# Patient Record
Sex: MALE | Race: WHITE | URBAN - METROPOLITAN AREA
[De-identification: names, ages, dates, MRNs, and addresses within clinical notes are randomized per-mention and may not be internally consistent; named-entity substitution may affect disease eponyms.]

---

## 2018-03-28 ENCOUNTER — HOSPITAL ENCOUNTER (INPATIENT)
Facility: CLINIC | Age: 43
LOS: 1 days | Discharge: HOME | DRG: 156 | End: 2018-03-30
Attending: EMERGENCY MEDICINE | Admitting: HOSPITALIST
Payer: OTHER GOVERNMENT

## 2018-03-28 ENCOUNTER — APPOINTMENT (OUTPATIENT)
Dept: CT IMAGING | Facility: CLINIC | Age: 43
DRG: 156 | End: 2018-03-28
Attending: EMERGENCY MEDICINE
Payer: OTHER GOVERNMENT

## 2018-03-28 DIAGNOSIS — J39.0 CELLULITIS OF PARAPHARYNGEAL SPACE: ICD-10-CM

## 2018-03-28 LAB
ANION GAP SERPL CALCULATED.3IONS-SCNC: 9 MMOL/L (ref 3–14)
BASOPHILS # BLD AUTO: 0 10E9/L (ref 0–0.2)
BASOPHILS NFR BLD AUTO: 0.2 %
BUN SERPL-MCNC: 18 MG/DL (ref 7–30)
CALCIUM SERPL-MCNC: 9 MG/DL (ref 8.5–10.1)
CHLORIDE SERPL-SCNC: 104 MMOL/L (ref 94–109)
CO2 SERPL-SCNC: 26 MMOL/L (ref 20–32)
CREAT SERPL-MCNC: 1.13 MG/DL (ref 0.66–1.25)
DEPRECATED S PYO AG THROAT QL EIA: NORMAL
DIFFERENTIAL METHOD BLD: ABNORMAL
EOSINOPHIL # BLD AUTO: 0.1 10E9/L (ref 0–0.7)
EOSINOPHIL NFR BLD AUTO: 0.5 %
ERYTHROCYTE [DISTWIDTH] IN BLOOD BY AUTOMATED COUNT: 12.6 % (ref 10–15)
GFR SERPL CREATININE-BSD FRML MDRD: 71 ML/MIN/1.7M2
GLUCOSE SERPL-MCNC: 103 MG/DL (ref 70–99)
HCT VFR BLD AUTO: 42 % (ref 40–53)
HGB BLD-MCNC: 15 G/DL (ref 13.3–17.7)
IMM GRANULOCYTES # BLD: 0.1 10E9/L (ref 0–0.4)
IMM GRANULOCYTES NFR BLD: 0.3 %
LYMPHOCYTES # BLD AUTO: 2 10E9/L (ref 0.8–5.3)
LYMPHOCYTES NFR BLD AUTO: 13.3 %
MCH RBC QN AUTO: 32.6 PG (ref 26.5–33)
MCHC RBC AUTO-ENTMCNC: 35.7 G/DL (ref 31.5–36.5)
MCV RBC AUTO: 91 FL (ref 78–100)
MONOCYTES # BLD AUTO: 0.8 10E9/L (ref 0–1.3)
MONOCYTES NFR BLD AUTO: 5.3 %
NEUTROPHILS # BLD AUTO: 11.9 10E9/L (ref 1.6–8.3)
NEUTROPHILS NFR BLD AUTO: 80.4 %
PLATELET # BLD AUTO: 180 10E9/L (ref 150–450)
POTASSIUM SERPL-SCNC: 3.5 MMOL/L (ref 3.4–5.3)
RBC # BLD AUTO: 4.6 10E12/L (ref 4.4–5.9)
SODIUM SERPL-SCNC: 139 MMOL/L (ref 133–144)
SPECIMEN SOURCE: NORMAL
WBC # BLD AUTO: 14.8 10E9/L (ref 4–11)

## 2018-03-28 PROCEDURE — 25000128 H RX IP 250 OP 636: Performed by: EMERGENCY MEDICINE

## 2018-03-28 PROCEDURE — 96361 HYDRATE IV INFUSION ADD-ON: CPT

## 2018-03-28 PROCEDURE — 87880 STREP A ASSAY W/OPTIC: CPT | Performed by: EMERGENCY MEDICINE

## 2018-03-28 PROCEDURE — 80048 BASIC METABOLIC PNL TOTAL CA: CPT | Performed by: EMERGENCY MEDICINE

## 2018-03-28 PROCEDURE — 99285 EMERGENCY DEPT VISIT HI MDM: CPT | Mod: 25

## 2018-03-28 PROCEDURE — 94640 AIRWAY INHALATION TREATMENT: CPT

## 2018-03-28 PROCEDURE — 25000132 ZZH RX MED GY IP 250 OP 250 PS 637: Performed by: EMERGENCY MEDICINE

## 2018-03-28 PROCEDURE — 36415 COLL VENOUS BLD VENIPUNCTURE: CPT

## 2018-03-28 PROCEDURE — 40000275 ZZH STATISTIC RCP TIME EA 10 MIN

## 2018-03-28 PROCEDURE — 87081 CULTURE SCREEN ONLY: CPT | Performed by: EMERGENCY MEDICINE

## 2018-03-28 PROCEDURE — 96375 TX/PRO/DX INJ NEW DRUG ADDON: CPT

## 2018-03-28 PROCEDURE — 85025 COMPLETE CBC W/AUTO DIFF WBC: CPT | Performed by: EMERGENCY MEDICINE

## 2018-03-28 PROCEDURE — 70491 CT SOFT TISSUE NECK W/DYE: CPT

## 2018-03-28 PROCEDURE — 96365 THER/PROPH/DIAG IV INF INIT: CPT | Mod: 59

## 2018-03-28 PROCEDURE — 92511 NASOPHARYNGOSCOPY: CPT

## 2018-03-28 PROCEDURE — 25000125 ZZHC RX 250: Performed by: EMERGENCY MEDICINE

## 2018-03-28 RX ORDER — AMPICILLIN AND SULBACTAM 2; 1 G/1; G/1
3 INJECTION, POWDER, FOR SOLUTION INTRAMUSCULAR; INTRAVENOUS ONCE
Status: COMPLETED | OUTPATIENT
Start: 2018-03-28 | End: 2018-03-29

## 2018-03-28 RX ORDER — IOPAMIDOL 755 MG/ML
80 INJECTION, SOLUTION INTRAVASCULAR ONCE
Status: COMPLETED | OUTPATIENT
Start: 2018-03-28 | End: 2018-03-28

## 2018-03-28 RX ORDER — SODIUM CHLORIDE 9 MG/ML
1000 INJECTION, SOLUTION INTRAVENOUS CONTINUOUS
Status: DISCONTINUED | OUTPATIENT
Start: 2018-03-28 | End: 2018-03-29

## 2018-03-28 RX ORDER — KETOROLAC TROMETHAMINE 30 MG/ML
30 INJECTION, SOLUTION INTRAMUSCULAR; INTRAVENOUS ONCE
Status: COMPLETED | OUTPATIENT
Start: 2018-03-28 | End: 2018-03-28

## 2018-03-28 RX ORDER — DEXAMETHASONE SODIUM PHOSPHATE 10 MG/ML
10 INJECTION, SOLUTION INTRAMUSCULAR; INTRAVENOUS ONCE
Status: COMPLETED | OUTPATIENT
Start: 2018-03-28 | End: 2018-03-28

## 2018-03-28 RX ORDER — IOPAMIDOL 755 MG/ML
80 INJECTION, SOLUTION INTRAVASCULAR ONCE
Status: DISCONTINUED | OUTPATIENT
Start: 2018-03-28 | End: 2018-03-28 | Stop reason: DRUGHIGH

## 2018-03-28 RX ADMIN — RACEPINEPHRINE HYDROCHLORIDE 0.5 ML: 11.25 SOLUTION RESPIRATORY (INHALATION) at 23:55

## 2018-03-28 RX ADMIN — AMPICILLIN SODIUM AND SULBACTAM SODIUM 3 G: 2; 1 INJECTION, POWDER, FOR SOLUTION INTRAMUSCULAR; INTRAVENOUS at 23:41

## 2018-03-28 RX ADMIN — SODIUM CHLORIDE 60 ML: 9 INJECTION, SOLUTION INTRAVENOUS at 23:04

## 2018-03-28 RX ADMIN — DEXAMETHASONE SODIUM PHOSPHATE 10 MG: 10 INJECTION, SOLUTION INTRAMUSCULAR; INTRAVENOUS at 22:24

## 2018-03-28 RX ADMIN — KETOROLAC TROMETHAMINE 30 MG: 30 INJECTION, SOLUTION INTRAMUSCULAR at 22:24

## 2018-03-28 RX ADMIN — IOPAMIDOL 80 ML: 755 INJECTION, SOLUTION INTRAVENOUS at 23:04

## 2018-03-28 RX ADMIN — SODIUM CHLORIDE 1000 ML: 9 INJECTION, SOLUTION INTRAVENOUS at 22:23

## 2018-03-28 ASSESSMENT — ENCOUNTER SYMPTOMS
ABDOMINAL PAIN: 0
TROUBLE SWALLOWING: 1
SORE THROAT: 1

## 2018-03-28 NOTE — IP AVS SNAPSHOT
MRN:[id number]                      After Visit Summary   3/28/2018    Tom Benavides    MRN: [id number]           Thank you!     Thank you for choosing [facility] for your care. Our goal is always to provide you with excellent care. Hearing back from our patients is one way we can continue to improve our services. Please take a few minutes to complete the written survey that you may receive in the mail after you visit with us. Thank you!        Patient Information     Date Of Birth          1975        Designated Caregiver       Most Recent Value    Caregiver    Will someone help with your care after discharge? yes    Name of designated caregiver Jaquelin, spouse.    Phone number of caregiver See contact information    Caregiver address See contact information      About your hospital stay     You were admitted on:  March 29, 2018 You last received care in the:  [facility] Intensive [facility] Unit    You were discharged on:  March 30, 2018        Reason for your hospital stay       This is a 43 year old male admitted with pharyngitis.                  Who to Call     For medical emergencies, please call 911.  For non-urgent questions about your medical care, please call your primary care provider or clinic, None          Attending Provider     Provider Specialty    Trierweiler, Chad A, MD Emergency Medicine    [facility], Philip RIOS MD Internal Medicine       Primary Care Provider Fax #    Physician No Ref-Primary [phone]      After Care Instructions     Activity       Your activity upon discharge: activity as tolerated            Diet       Follow this diet upon discharge: Regular                  Follow-up Appointments     Follow-up and recommended labs and tests        Follow up with primary care provider, Physician No Ref-Primary, within 7 days for hospital follow- up.  No follow up labs or test are needed.                  Pending Results     Date and Time Order Name Status Description  "   3/29/2018 0140 Blood culture Preliminary     3/29/2018 0140 Blood culture Preliminary     3/28/2018 2229 Beta strep group A culture Preliminary             Statement of Approval     Ordered          18 0942  I have reviewed and agree with all the recommendations and orders detailed in this document.  EFFECTIVE NOW     Approved and electronically signed by:  Angel Phillip MD             Admission Information     Date & Time Provider Department Dept. Phone    3/28/2018 Philip Dale MD [facility] Intensive Care Unit [phone]      Your Vitals Were     Blood Pressure Pulse Temperature Respirations Weight Pulse Oximetry    132/82 89 98  F (36.7  C) (Oral) 18 119.5 kg (263 lb 7.2 oz) 95%      MyChart Information     [employer] lets you send messages to your doctor, view your test results, renew your prescriptions, schedule appointments and more. To sign up, go to [email or web address] . Click on \"Log in\" on the left side of the screen, which will take you to the Welcome page. Then click on \"Sign up Now\" on the right side of the page.     You will be asked to enter the access code listed below, as well as some personal information. Please follow the directions to create your username and password.     Your access code is: [email or web address]  Expires: 2018  9:41 AM     Your access code will  in 90 days. If you need help or a new code, please call your [facility] clinic or [phone].        Care EveryWhere ID     This is your Care EveryWhere ID. This could be used by other organizations to access your [facility] medical records  [id number]        Equal Access to Services     [facility] Center[facility]EL : Hadii bryan Lazo, waaxda luqadaha, qaybta kaalmada daily, ant gerber. So [facility] [phone].    ATENCIÓN: Si habla español, tiene a jenkins disposición servicios gratuitos de asistencia lingüística. Llame al [phone].    We comply with applicable federal " civil rights laws and [place] laws. We do not discriminate on the basis of race, color, national origin, age, disability, sex, sexual orientation, or gender identity.               Review of your medicines      START taking        Dose / Directions    amoxicillin-clavulanate 875-125 MG per tablet   Commonly known as:  AUGMENTIN   Used for:  Cellulitis of parapharyngeal space        Dose:  1 tablet   Take 1 tablet by mouth 2 times daily   Quantity:  20 tablet   Refills:  0       predniSONE 20 MG tablet   Commonly known as:  DELTASONE   Used for:  Cellulitis of parapharyngeal space        Dose:  40 mg   Take 2 tablets (40 mg) by mouth daily for 5 days   Quantity:  5 tablet   Refills:  0         CONTINUE these medicines which have NOT CHANGED        Dose / Directions    multivitamin, therapeutic with minerals Tabs tablet        Dose:  1 tablet   Take 1 tablet by mouth daily   Refills:  0       omega-3 acid ethyl esters 1 G capsule   Commonly known as:  Lovaza        Dose:  1 g   Take 1 g by mouth daily   Refills:  0            Where to get your medicines      These medications were sent to [facility] - [address]  [address] [facility] [address]     Phone:  [phone]     amoxicillin-clavulanate 875-125 MG per tablet    predniSONE 20 MG tablet                Protect others around you: Learn how to safely use, store and throw away your medicines at www.disposemymeds.org.        ANTIBIOTIC INSTRUCTION     You've Been Prescribed an Antibiotic - Now What?  Your healthcare team thinks that you or your loved one might have an infection. Some infections can be treated with antibiotics, which are powerful, life-saving drugs. Like all medications, antibiotics have side effects and should only be used when necessary. There are some important things you should know about your antibiotic treatment.      Your healthcare team may run tests before you start taking an  antibiotic.    Your team may take samples (e.g., from your blood, urine or other areas) to run tests to look for bacteria. These test can be important to determine if you need an antibiotic at all and, if you do, which antibiotic will work best.      Within a few days, your healthcare team might change or even stop your antibiotic.    Your team may start you on an antibiotic while they are working to find out what is making you sick.    Your team might change your antibiotic because test results show that a different antibiotic would be better to treat your infection.    In some cases, once your team has more information, they learn that you do not need an antibiotic at all. They may find out that you don't have an infection, or that the antibiotic you're taking won't work against your infection. For example, an infection caused by a virus can't be treated with antibiotics. Staying on an antibiotic when you don't need it is more likely to be harmful than helpful.      You may experience side effects from your antibiotic.    Like all medications, antibiotics have side effects. Some of these can be serious.    Let you healthcare team know if you have any known allergies when you are admitted to the hospital.    One significant side effect of nearly all antibiotics is the risk of severe and sometimes deadly diarrhea caused by Clostridium difficile (C. Difficile). This occurs when a person takes antibiotics because some good germs are destroyed. Antibiotic use allows C. diificile to take over, putting patients at high risk for this serious infection.    As a patient or caregiver, it is important to understand your or your loved one's antibiotic treatment. It is especially important for caregivers to speak up when patients can't speak for themselves. Here are some important questions to ask your healthcare team.    What infection is this antibiotic treating and how do you know I have that infection?    What side effects  might occur from this antibiotic?    How long will I need to take this antibiotic?    Is it safe to take this antibiotic with other medications or supplements (e.g., vitamins) that I am taking?     Are there any special directions I need to know about taking this antibiotic? For example, should I take it with food?    How will I be monitored to know whether my infection is responding to the antibiotic?    What tests may help to make sure the right antibiotic is prescribed for me?      Information provided by:  www.cdc.gov/getsmart  U.S. Department of Health and Human Services  Centers for disease Control and Prevention  National Center for Emerging and Zoonotic Infectious Diseases  Division of Healthcare Quality Promotion             Medication List: This is a list of all your medications and when to take them. Check marks below indicate your daily home schedule. Keep this list as a reference.      Medications           Morning Afternoon Evening Bedtime As Needed    amoxicillin-clavulanate 875-125 MG per tablet   Commonly known as:  AUGMENTIN   Take 1 tablet by mouth 2 times daily                                multivitamin, therapeutic with minerals Tabs tablet   Take 1 tablet by mouth daily                                omega-3 acid ethyl esters 1 G capsule   Commonly known as:  Lovaza   Take 1 g by mouth daily                                predniSONE 20 MG tablet   Commonly known as:  DELTASONE   Take 2 tablets (40 mg) by mouth daily for 5 days

## 2018-03-28 NOTE — IP AVS SNAPSHOT
[facility]    [address] [place] [address]    Phone:  [phone]                                       After Visit Summary   3/28/2018    Tom Benavides    MRN: [id number]           After Visit Summary Signature Page     I have received my discharge instructions, and my questions have been answered. I have discussed any challenges I see with this plan with the nurse or doctor.    ..........................................................................................................................................  Patient/Patient Representative Signature      ..........................................................................................................................................  Patient Representative Print Name and Relationship to Patient    ..................................................               ................................................  Date                                            Time    ..........................................................................................................................................  Reviewed by Signature/Title    ...................................................              ..............................................  Date                                                            Time

## 2018-03-29 PROBLEM — J38.4 SUBGLOTTIC EDEMA: Status: ACTIVE | Noted: 2018-03-29

## 2018-03-29 LAB
ANION GAP SERPL CALCULATED.3IONS-SCNC: 10 MMOL/L (ref 3–14)
BASOPHILS # BLD AUTO: 0 10E9/L (ref 0–0.2)
BASOPHILS NFR BLD AUTO: 0.1 %
BUN SERPL-MCNC: 16 MG/DL (ref 7–30)
CALCIUM SERPL-MCNC: 8.9 MG/DL (ref 8.5–10.1)
CHLORIDE SERPL-SCNC: 104 MMOL/L (ref 94–109)
CO2 SERPL-SCNC: 22 MMOL/L (ref 20–32)
CREAT SERPL-MCNC: 0.95 MG/DL (ref 0.66–1.25)
DIFFERENTIAL METHOD BLD: ABNORMAL
EOSINOPHIL # BLD AUTO: 0 10E9/L (ref 0–0.7)
EOSINOPHIL NFR BLD AUTO: 0 %
ERYTHROCYTE [DISTWIDTH] IN BLOOD BY AUTOMATED COUNT: 12.4 % (ref 10–15)
GFR SERPL CREATININE-BSD FRML MDRD: 86 ML/MIN/1.7M2
GLUCOSE BLDC GLUCOMTR-MCNC: 141 MG/DL (ref 70–99)
GLUCOSE BLDC GLUCOMTR-MCNC: 143 MG/DL (ref 70–99)
GLUCOSE BLDC GLUCOMTR-MCNC: 144 MG/DL (ref 70–99)
GLUCOSE BLDC GLUCOMTR-MCNC: 149 MG/DL (ref 70–99)
GLUCOSE BLDC GLUCOMTR-MCNC: 159 MG/DL (ref 70–99)
GLUCOSE SERPL-MCNC: 165 MG/DL (ref 70–99)
HCT VFR BLD AUTO: 40.7 % (ref 40–53)
HGB BLD-MCNC: 14.6 G/DL (ref 13.3–17.7)
IMM GRANULOCYTES # BLD: 0 10E9/L (ref 0–0.4)
IMM GRANULOCYTES NFR BLD: 0.2 %
INR PPP: 1.11 (ref 0.86–1.14)
LYMPHOCYTES # BLD AUTO: 0.5 10E9/L (ref 0.8–5.3)
LYMPHOCYTES NFR BLD AUTO: 2.9 %
MCH RBC QN AUTO: 32.6 PG (ref 26.5–33)
MCHC RBC AUTO-ENTMCNC: 35.9 G/DL (ref 31.5–36.5)
MCV RBC AUTO: 91 FL (ref 78–100)
MONOCYTES # BLD AUTO: 0.5 10E9/L (ref 0–1.3)
MONOCYTES NFR BLD AUTO: 3.1 %
MRSA DNA SPEC QL NAA+PROBE: NEGATIVE
NEUTROPHILS # BLD AUTO: 16.4 10E9/L (ref 1.6–8.3)
NEUTROPHILS NFR BLD AUTO: 93.7 %
PLATELET # BLD AUTO: 174 10E9/L (ref 150–450)
POTASSIUM SERPL-SCNC: 4.2 MMOL/L (ref 3.4–5.3)
RBC # BLD AUTO: 4.48 10E12/L (ref 4.4–5.9)
SODIUM SERPL-SCNC: 136 MMOL/L (ref 133–144)
SPECIMEN SOURCE: NORMAL
WBC # BLD AUTO: 17.5 10E9/L (ref 4–11)

## 2018-03-29 PROCEDURE — 20000003 ZZH R&B ICU

## 2018-03-29 PROCEDURE — 87640 STAPH A DNA AMP PROBE: CPT | Performed by: HOSPITALIST

## 2018-03-29 PROCEDURE — 96376 TX/PRO/DX INJ SAME DRUG ADON: CPT

## 2018-03-29 PROCEDURE — 25000125 ZZHC RX 250: Performed by: EMERGENCY MEDICINE

## 2018-03-29 PROCEDURE — 99223 1ST HOSP IP/OBS HIGH 75: CPT | Mod: AI | Performed by: HOSPITALIST

## 2018-03-29 PROCEDURE — 85025 COMPLETE CBC W/AUTO DIFF WBC: CPT | Performed by: HOSPITALIST

## 2018-03-29 PROCEDURE — 87040 BLOOD CULTURE FOR BACTERIA: CPT | Performed by: EMERGENCY MEDICINE

## 2018-03-29 PROCEDURE — 00000146 ZZHCL STATISTIC GLUCOSE BY METER IP

## 2018-03-29 PROCEDURE — 96375 TX/PRO/DX INJ NEW DRUG ADDON: CPT

## 2018-03-29 PROCEDURE — 36415 COLL VENOUS BLD VENIPUNCTURE: CPT | Performed by: HOSPITALIST

## 2018-03-29 PROCEDURE — 87641 MR-STAPH DNA AMP PROBE: CPT | Performed by: HOSPITALIST

## 2018-03-29 PROCEDURE — 80048 BASIC METABOLIC PNL TOTAL CA: CPT | Performed by: HOSPITALIST

## 2018-03-29 PROCEDURE — 25000128 H RX IP 250 OP 636: Performed by: EMERGENCY MEDICINE

## 2018-03-29 PROCEDURE — 85610 PROTHROMBIN TIME: CPT | Performed by: HOSPITALIST

## 2018-03-29 PROCEDURE — 25000125 ZZHC RX 250: Performed by: HOSPITALIST

## 2018-03-29 PROCEDURE — 25000128 H RX IP 250 OP 636: Performed by: HOSPITALIST

## 2018-03-29 PROCEDURE — 99207 ZZC NON-BILLABLE SERV PER CHARTING: CPT | Performed by: INTERNAL MEDICINE

## 2018-03-29 RX ORDER — ONDANSETRON 4 MG/1
4 TABLET, ORALLY DISINTEGRATING ORAL EVERY 6 HOURS PRN
Status: DISCONTINUED | OUTPATIENT
Start: 2018-03-29 | End: 2018-03-30 | Stop reason: HOSPADM

## 2018-03-29 RX ORDER — NALOXONE HYDROCHLORIDE 0.4 MG/ML
.1-.4 INJECTION, SOLUTION INTRAMUSCULAR; INTRAVENOUS; SUBCUTANEOUS
Status: DISCONTINUED | OUTPATIENT
Start: 2018-03-29 | End: 2018-03-30 | Stop reason: HOSPADM

## 2018-03-29 RX ORDER — DEXAMETHASONE SODIUM PHOSPHATE 4 MG/ML
10 INJECTION, SOLUTION INTRA-ARTICULAR; INTRALESIONAL; INTRAMUSCULAR; INTRAVENOUS; SOFT TISSUE EVERY 6 HOURS
Status: DISCONTINUED | OUTPATIENT
Start: 2018-03-29 | End: 2018-03-30 | Stop reason: HOSPADM

## 2018-03-29 RX ORDER — ALBUTEROL SULFATE 0.83 MG/ML
2.5 SOLUTION RESPIRATORY (INHALATION)
Status: DISCONTINUED | OUTPATIENT
Start: 2018-03-29 | End: 2018-03-30 | Stop reason: HOSPADM

## 2018-03-29 RX ORDER — ONDANSETRON 2 MG/ML
4 INJECTION INTRAMUSCULAR; INTRAVENOUS EVERY 6 HOURS PRN
Status: DISCONTINUED | OUTPATIENT
Start: 2018-03-29 | End: 2018-03-30 | Stop reason: HOSPADM

## 2018-03-29 RX ORDER — AMPICILLIN AND SULBACTAM 2; 1 G/1; G/1
3 INJECTION, POWDER, FOR SOLUTION INTRAMUSCULAR; INTRAVENOUS EVERY 6 HOURS
Status: DISCONTINUED | OUTPATIENT
Start: 2018-03-29 | End: 2018-03-30 | Stop reason: HOSPADM

## 2018-03-29 RX ORDER — KETOROLAC TROMETHAMINE 30 MG/ML
30 INJECTION, SOLUTION INTRAMUSCULAR; INTRAVENOUS EVERY 6 HOURS PRN
Status: DISCONTINUED | OUTPATIENT
Start: 2018-03-29 | End: 2018-03-30 | Stop reason: HOSPADM

## 2018-03-29 RX ORDER — HYDROMORPHONE HYDROCHLORIDE 1 MG/ML
.3-.5 INJECTION, SOLUTION INTRAMUSCULAR; INTRAVENOUS; SUBCUTANEOUS
Status: DISCONTINUED | OUTPATIENT
Start: 2018-03-29 | End: 2018-03-30 | Stop reason: HOSPADM

## 2018-03-29 RX ORDER — ACETAMINOPHEN 650 MG/1
650 SUPPOSITORY RECTAL EVERY 4 HOURS PRN
Status: DISCONTINUED | OUTPATIENT
Start: 2018-03-29 | End: 2018-03-30 | Stop reason: HOSPADM

## 2018-03-29 RX ORDER — MULTIPLE VITAMINS W/ MINERALS TAB 9MG-400MCG
1 TAB ORAL DAILY
COMMUNITY

## 2018-03-29 RX ORDER — OMEGA-3-ACID ETHYL ESTERS 1 G/1
1 CAPSULE, LIQUID FILLED ORAL DAILY
COMMUNITY

## 2018-03-29 RX ORDER — CLINDAMYCIN PHOSPHATE 900 MG/50ML
900 INJECTION, SOLUTION INTRAVENOUS EVERY 8 HOURS
Status: DISCONTINUED | OUTPATIENT
Start: 2018-03-29 | End: 2018-03-30 | Stop reason: HOSPADM

## 2018-03-29 RX ORDER — DEXAMETHASONE SODIUM PHOSPHATE 10 MG/ML
10 INJECTION, SOLUTION INTRAMUSCULAR; INTRAVENOUS ONCE
Status: COMPLETED | OUTPATIENT
Start: 2018-03-29 | End: 2018-03-29

## 2018-03-29 RX ORDER — SODIUM CHLORIDE 9 MG/ML
INJECTION, SOLUTION INTRAVENOUS CONTINUOUS
Status: DISCONTINUED | OUTPATIENT
Start: 2018-03-29 | End: 2018-03-30 | Stop reason: HOSPADM

## 2018-03-29 RX ORDER — CLINDAMYCIN PHOSPHATE 900 MG/50ML
900 INJECTION, SOLUTION INTRAVENOUS ONCE
Status: COMPLETED | OUTPATIENT
Start: 2018-03-29 | End: 2018-03-29

## 2018-03-29 RX ADMIN — DEXAMETHASONE SODIUM PHOSPHATE 10 MG: 10 INJECTION, SOLUTION INTRAMUSCULAR; INTRAVENOUS at 01:53

## 2018-03-29 RX ADMIN — CLINDAMYCIN PHOSPHATE 900 MG: 18 INJECTION, SOLUTION INTRAVENOUS at 09:03

## 2018-03-29 RX ADMIN — CLINDAMYCIN PHOSPHATE 900 MG: 18 INJECTION, SOLUTION INTRAVENOUS at 17:22

## 2018-03-29 RX ADMIN — AMPICILLIN SODIUM AND SULBACTAM SODIUM 3 G: 2; 1 INJECTION, POWDER, FOR SOLUTION INTRAMUSCULAR; INTRAVENOUS at 12:19

## 2018-03-29 RX ADMIN — SODIUM CHLORIDE: 9 INJECTION, SOLUTION INTRAVENOUS at 15:05

## 2018-03-29 RX ADMIN — DEXAMETHASONE SODIUM PHOSPHATE 10 MG: 4 INJECTION, SOLUTION INTRAMUSCULAR; INTRAVENOUS at 08:57

## 2018-03-29 RX ADMIN — KETOROLAC TROMETHAMINE 30 MG: 30 INJECTION, SOLUTION INTRAMUSCULAR at 06:58

## 2018-03-29 RX ADMIN — DEXAMETHASONE SODIUM PHOSPHATE 10 MG: 4 INJECTION, SOLUTION INTRAMUSCULAR; INTRAVENOUS at 14:46

## 2018-03-29 RX ADMIN — AMPICILLIN SODIUM AND SULBACTAM SODIUM 3 G: 2; 1 INJECTION, POWDER, FOR SOLUTION INTRAMUSCULAR; INTRAVENOUS at 06:42

## 2018-03-29 RX ADMIN — DEXAMETHASONE SODIUM PHOSPHATE 10 MG: 4 INJECTION, SOLUTION INTRAMUSCULAR; INTRAVENOUS at 20:11

## 2018-03-29 RX ADMIN — AMPICILLIN SODIUM AND SULBACTAM SODIUM 3 G: 2; 1 INJECTION, POWDER, FOR SOLUTION INTRAMUSCULAR; INTRAVENOUS at 18:32

## 2018-03-29 RX ADMIN — CLINDAMYCIN PHOSPHATE 900 MG: 18 INJECTION, SOLUTION INTRAVENOUS at 01:53

## 2018-03-29 RX ADMIN — SODIUM CHLORIDE: 9 INJECTION, SOLUTION INTRAVENOUS at 03:05

## 2018-03-29 ASSESSMENT — ACTIVITIES OF DAILY LIVING (ADL)
DRESS: 0-->INDEPENDENT
FALL_HISTORY_WITHIN_LAST_SIX_MONTHS: NO
RETIRED_EATING: 0-->INDEPENDENT
COGNITION: 0 - NO COGNITION ISSUES REPORTED
AMBULATION: 0-->INDEPENDENT
TRANSFERRING: 0-->INDEPENDENT
BATHING: 0-->INDEPENDENT
RETIRED_COMMUNICATION: 0-->UNDERSTANDS/COMMUNICATES WITHOUT DIFFICULTY
TOILETING: 0-->INDEPENDENT
SWALLOWING: 0-->SWALLOWS FOODS/LIQUIDS WITHOUT DIFFICULTY

## 2018-03-29 ASSESSMENT — PAIN DESCRIPTION - DESCRIPTORS
DESCRIPTORS: SORE
DESCRIPTORS: ACHING
DESCRIPTORS: SORE

## 2018-03-29 NOTE — ED PROVIDER NOTES
History     Chief Complaint:  Pharyngitis    HPI   Tom Benavides is a 43 year old male who presents to the emergency department today for evaluation of pharyngitis. The patient reports an onset of sore throat today around 1700 with difficulty breathing on his left side and trouble swallowing. He has never felt this sensation in the past and it is getting harder to breath prompting his visit to the emergency department. He denies fever and abdominal pain.     HISTORY LIMITED SECONDARY TO ACUITY OF CONDITION    Allergies:  No Known Drug Allergies    Medications:    The patient is currently on no regular medications.    Past Medical History:    History reviewed. No pertinent past medical history.    Past Surgical History:    History reviewed. No pertinent surgical history.    Family History:    History reviewed. No pertinent family history.     Social History:  The patient was alone.  Smoking Status: Never  Smokeless Tobacco: Never  Alcohol Use: Yes    Review of Systems   HENT: Positive for sore throat and trouble swallowing.    Respiratory:        Difficulty breathing   Gastrointestinal: Negative for abdominal pain.   All other systems reviewed and are negative.  ROS LIMITED SECONDARY TO ACUITY OF CONDITION  Physical Exam     Patient Vitals for the past 24 hrs:   BP Temp Temp src Pulse Resp SpO2 Weight   03/28/18 2144 (!) 159/95 97.9  F (36.6  C) Oral 89 16 95 % 118.8 kg (262 lb)       Physical Exam  General: Uncomfortable, overweight middle aged gentleman sitting at the bedside, spitting his saliva into a emesis bag and holding his left neck.     Eye:  Pupils are equal, round, and reactive.  Extraocular movements intact.    ENT:  No rhinorrhea.  Moist mucus membranes.  Normal tongue. The tonsils are 1+ without exudates and equal in size. No trismus noted.     Lymphatic: No anterior cervical lymphadenopathy. No submandibular swelling or woody edema.     Cardiac:  Regular rate and rhythm.  No murmurs, gallops, or  rubs.    Pulmonary:  Clear to auscultation bilaterally.  No wheezes, rales, or rhonchi. Mild stridor, though no increased work of breathing noted.     Abdomen:  Positive bowel sounds.  Abdomen is soft and non-distended, without focal tenderness.    Musculoskeletal:  Normal movement of all extremities without evidence for deficit.    Skin:  Warm and dry without rashes.    Neurologic:  Non-focal exam without asymmetric weakness or numbness.     Psychiatric:  Normal affect with appropriate interaction with examiner.    Emergency Department Course   Imaging:  Radiology findings were communicated with the patient who voiced understanding of the findings.  Soft tissue neck CT w contrast  Left tonsilar and peritonsilar soft tissue thickening with tiny hypodensity which could be phlegmon or ealry abscess. No drainable abscess.  Additiionally there is thickening of epiglottis and left aryepiglottic fold and significant thickening with resultant airway narrowing in immediated subglottic region. This is likley infectious/ inflammmatory.  Final report to follow.  Discussed with ER MD at 11:20pm  Report per radiology     Laboratory:  Laboratory findings were communicated with the patient who voiced understanding of the findings.  CBC: WBC 14.8 (H) o/w WNL. (HGB 15.0, )   BMP: glucose 103 (H) o/w WNL (Creatinine 1.13)  Rapid strep screen: Negative     Beta Strep Group A Culture: Pending     Procedures:  Nasopharyngoscopy: The patient was verbally consented for the procedure.  I anesthetized the posterior pharynx with Cetacaine.  I placed a nasopharyngeal airway with a 26 Korean tube coated in viscous lidocaine.  Once the nasal area was numb and dilated, I passed a long nasopharyngeal scope through the right nares with excellent visualization of the larynx.  There is generalized edema, left greater than right with some swelling of the epiglottis and the aryepiglottic folds.  However, the vocal cords are well visualized and  phonate appropriately.  The patient tolerated the procedure well.    Interventions:  2223 NS Bolus 1,000mL IV  2224 Decadron 10 mg IV  2224 Toradol 30 mg IV  2341 Unasyn 3 g IV  2355 Racepinephrine 0.5 mL nebulization  0120 Clindamycin 900 mg IV  0200 Dexamethasone - 10 mg IV    Medications   sodium chloride (PF) 0.9% PF flush 3 mL (not administered)   sodium chloride (PF) 0.9% PF flush 3 mL (not administered)   0.9% sodium chloride BOLUS (0 mLs Intravenous Stopped 3/28/18 2341)     Followed by   sodium chloride 0.9% infusion (not administered)   ampicillin-sulbactam (UNASYN) 3 g vial to attach to  mL bag (3 g Intravenous New Bag 3/28/18 2341)   Benzocaine (HURRICAINE/TOPEX) 20 % spray 2.5 mL (not administered)   lidocaine 2 % (URO-JET) jelly 10 mL (not administered)   lidocaine (XYLOCAINE) 2 % topical gel (not administered)   dexamethasone (DECADRON) injection 10 mg (10 mg Intravenous Given 3/28/18 2224)   ketorolac (TORADOL) injection 30 mg (30 mg Intravenous Given 3/28/18 2224)   Saline Flush - CT (60 mLs Intravenous Given 3/28/18 2304)   iopamidol (ISOVUE-370) solution 80 mL (80 mLs Intravenous Given 3/28/18 2304)   RacEPINEPHrine neb solution 0.5 mL (0.5 mLs Nebulization Given 3/28/18 2355)        Emergency Department Course:  Nursing notes and vitals reviewed.  IV was inserted and blood was drawn for laboratory testing, results above.  The patient was sent for a Soft tissue neck CT w contrast while in the emergency department, results above.   The patient's throat was swabbed and this sample was sent for rapid strep screen, findings above.   2207: I performed an exam of the patient as documented above.   2315: Patient rechecked and updated.   2330: Patient rechecked and updated.   2338: I spoke with Dr. Mills of the anesthesiology service regarding patient's presentation, findings, and plan of care.  2345: Patient rechecked and updated.   0025: I performed a nasal pharyngoscopy procedure as noted above,  "with Dr. Mills in attendance.    0035:  I spoke with Dr. Han, Intensivist about the patient.  0045:  I spoke with Dr. Dale, admitting hospitalist about the patient  0100:  I spoke with the patient's wife   0130:  I signed the patient out to Dr. Roman while patient still in the ED.    Findings and plan explained to the Patient who consents to admission. Discussed the patient with Dr. Dale, who will admit the patient to an ICU bed for further monitoring, evaluation, and treatment.  I personally reviewed the laboratory and imaging results with the Patient and answered all related questions prior to admission.    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  The patient notes he had a very normal day in his training sessions.  He laid down in his hotel at 5:00 to take a nap.  When he awoke at 6:00, he noted some irritation to the left side of his throat.  He ate a few tacos and noted that the swelling became much worse and he was having a difficult time swallowing.  This then progressed to a sensation as though \"someone is squeezing my neck\" causing difficulty with breathing.  He notes it is solely on the left side and feels like an expanding mass.  The patient otherwise denies any fevers.  He denies sick contacts.  He denies any history of throat issues.  He denies medication use or allergies.    I was called to the room as the patient called the technician to the room twice, noting that his airway was feeling worse.  As I assessed the patient, I find him sitting on the edge of the bed, somewhat anxious but otherwise interacting appropriately.  He is consistently spitting his saliva out as he has pain when he swallows.  He has some mildly noisy breathing, more on inspiration and on expiration.  However, his lungs are generally clear.  " He is able to open his jaw wide for me with no signs of trismus.  The posterior pharynx that I can visualize is completely unremarkable with very small tonsils and no evidence of a peritonsillar abscess.  With this, I asked to have an IV established immediately and the patient was given Decadron and Toradol along with IV fluids.  A CT of the soft tissue neck was ordered while we awaited the patient's lab work which does show a leukocytosis but no other sign of significant abnormality.  CT of the neck does show market swelling throughout the posterior laryngeal area, left greater than right, with a probable infectious process.  There is no drainable abscess.    With the CT findings, I ordered a dose of Unasyn to cover for typical pathogens.  I reviewed the CT and spoke with radiology.  I was concerned with the degree of swelling and narrowing of the airway.  I went back to reassess the patient and he denies that he is feeling any better, but also denies that the swelling is worsening.  With this, I became concerned that getting control of the airway may be necessary, considering that this has swollen up over a period of only 4-5 hours.  Because the patient still had full control of his airway was able to communicate appropriately, I paged anesthesia as I anticipated a possible difficult intubation.  Dr. Mills graciously agreed to assess the patient with me at the bedside, and his formal recommendation was to hold off on intubation at this time as the patient does have a patent airway and is able to phonate and breathe without obvious difficulty.  He did recommend nasopharyngoscopy.  I set this up as noted above and asked Dr. Mills to join me for this direct look.  The posterior pharynx was very well visualized, and while it was generally inflamed and swollen, there was clear access to the vocal cords without a significant asymmetric mass.  After this direct look with anesthesia, again, the decision was made to defer  definitive airway management at this point.    With this, I moved toward admitting the patient for very close monitoring.  I first spoke with Dr. Han of the intensive care unit team.  She notes that she will be in-house all night and appreciated being made aware of this patient.  She promises to check on the patient regularly and to intervene if there is any evidence of an airway issue.  I then spoke with Dr. Dale of the hospitalist service who agrees to admit the patient.  After assessing him, he requested that we obtain blood cultures and broaden his coverage with clindamycin.  Further Decadron will be given and racemic epinephrine nebs may be provided as needed.  I believe that all parties involved are acutely aware of watching this patient's airway closely.  Most importantly, the patient understands that there should be absolutely no hesitation to contact his nurse if he feels as though there is even the slightest worsening of his airway.  I spoke with his wife at length and updated her on the situation at hand.  Finally, since there was a delay in getting the patient to the intensive care unit as a nurse needed to be called in from home, I signed the patient out to Dr. Jose Moon, fellow emergency physician, who has been involved in the patient's care with me from the beginning and fully understands the airway situation.    Diagnosis:    ICD-10-CM    1. Cellulitis of parapharyngeal space J39.0        Critical CARE time: 90 minutes exclusive of all procedures      Disposition:  Admitted to ICU bed with Dr. Dale    Scribe Disclosure:  Alcides ARZATE, am serving as a scribe at 9:49 PM on 3/28/2018 to document services personally performed by Trierweiler, Chad A, MD based on my observations and the provider's statements to me.     3/28/2018    EMERGENCY DEPARTMENT       Trierweiler, Chad A, MD  03/29/18 0140

## 2018-03-29 NOTE — PHARMACY-ADMISSION MEDICATION HISTORY
Admission medication history interview status for the 3/28/2018  admission is complete. See EPIC admission navigator for prior to admission medications     Medication history source reliability:Good    Actions taken by pharmacist (provider contacted, etc):  Spoke w/ patient.     Additional medication history information not noted on PTA med list :   - Patient states that he is not currently taking any prescription medications.     Medication reconciliation/reorder completed by provider prior to medication history? No    Time spent in this activity: 10 minutes    Prior to Admission medications    Medication Sig Last Dose Taking? Auth Provider   multivitamin, therapeutic with minerals (THERA-VIT-M) TABS tablet Take 1 tablet by mouth daily  Yes Unknown, Entered By History   omega-3 acid ethyl esters (LOVAZA) 1 G capsule Take 1 g by mouth daily  Yes Unknown, Entered By History     Zoya Smyth, PharmD, BCPS

## 2018-03-29 NOTE — PLAN OF CARE
Problem: Patient Care Overview  Goal: Plan of Care/Patient Progress Review  Outcome: No Change  Pt arrived from ED 0245 via cart, accompanied by RN, ambulated to bed with standby assist, well tolerated, denies pain overnight, very stoic, when pressed will admit to sore throat, given toradol per MAR, watching airway, no stridor/wheezes, continuing IV steroids and abx, see MAR, ENT to see pt.

## 2018-03-29 NOTE — CONSULTS
"[facility] Consultation by [facility]    Tom Benavides MRN# [id number]   Age: 43 year old YOB: 1975     Date of Admission:  3/28/2018    Reason for consult: Sore throat, trouble swallowing       Requesting physician: Phliip Dale MD                           Chief Complaint:   Pharyngitis (sore throat starting today)              HPI:      HPI:        Constantin is a very pleasant 43-year old , in town for ground school training, who developed an acutely worsening left-sided sore throat beginning at 5 PM last night.  It rapidly progressed to difficulty swallowing and spitting out his own secretions, voice change and a feeling of shortness of breath.  He presented to the ED where a CT was done showing left parapharyngeal swelling without evidence of a fluid collection.  He has received unasyn and decadron and been admitted to the ICU.  He is fairly stoic but reports his pain is now modest, and his are respirations quiet, and voice normal.  Previous tests and diagnostic procedures: see \"Tests and Procedures\" and \"PFSH\".               Past Medical History:   Influenzae 1 month ago.  History of recurrent ear and sinus infections--none recently.          Past Surgical History:   History reviewed. No pertinent surgical history.            Social History:     Pt is a , resides with his family in [place].  Currently in town for ground school training.            Family History:   No family history on file.            Immunizations:     There is no immunization history on file for this patient.            Allergies:   Review of patient's allergies indicates no known allergies.          Medications:     No current outpatient prescriptions on file.             Review of Systems:   Review Of Systems    Ears/Nose/Throat: left sided sore throat, slight discomfort with swallowing  Respiratory: currently, quiet respirations, no stridor or dyspnea}            Physical exam: "   CONSTITUTION:    /82  Pulse 89  Temp 98.1  F (36.7  C) (Oral)  Resp 15  Wt 118.8 kg (262 lb)  SpO2 95%     General appearance:Well developed, well nourished and groomed. No apparent acute or chronic distress.    Ability to communicate: normal.    EAR, NOSE, MOUTH AND THROAT:    Pinnas and External Nose: Normal.    Otoscopic exam: Normal canals and pinpoint right TM perforation with myringosclerotic plaque  Hearing: Conversational speech rarely or never misunderstands words.    Nasal Interior:    Septum - Midline.    Turbinates and middle meatus - Normal.    Rhinorrhea - None.    Vestibular skin - Normal bilaterally.    Mucosa - Normal.    Lips, Teeth and Gums: Normal.    Oral Cavity and Oropharynx: Normal.  No trismus. 2+ symmetrical tonsils--no erythema or exudate.   Neck: normal symmetry; trachea midline; normal laryngeal crepitation; no adenopathy; no neck masses; no skin lesions; no scars.    Thyroid: normal size; no masses or tenderness.        LYMPH NODES: Neck Nodes: normal, no adenopathy.       NEUROLOGIC:    Level of orientation: normal to time, place, person and situation.    Cranial nerves: Cranial nerves II-XII grossly intact and symmetrical.       PSYCHIATRIC:    Level of consciousness: awake and alert.    Judgment and insight: normal.    Mood and affect: normal and appropriate to the situation.       BEDSIDE FLEXIBLE FIBEROPTIC LARYNGOSCOPY:  Intranasal Afrin applied.  Lidocaine jelly applied to scope.  Scope passed through the right nasal cavity.  Nasal mucosa and nasopharynx normal.  Tongue base symmetrical.  Epiglottis normal.    Localized area of erythema and swelling just lateral to the left aryepiglottic fold.  Vocal folds mobile and without lesions.  Airway widely patent.          Data:   All laboratory data reviewed  All imaging studies reviewed by me.     Assessment and Plan:     Constantin is a pleasant 43-year-old gentleman admitted after he developed rapidly progressive left  throat pain with voice change, difficulty swallowing, stridor and dyspnea.  A neck CT revealed significant left parapharyngeal swelling in the absence of abscess.  He has responded rapidly to Unasyn and Decadron.  Laryngoscopy shows a localized area of erythema and swelling of the left lateral pharyngeal wall just lateral to his aryepiglottic fold.  I discussed his care with the Hospitalist service and would suggest monitoring until tomorrow AM.  Assuming his symptoms continue their trend toward improvement,  he could be discharged on augmentin 875 mg bid x 10 days and prednisone 40 mg qd x 5 days.  Attestation:  I have reviewed today's vital signs, notes, medications, medication allergies, and PFSH/ROSlabs and imaging.    Gail Mathews MD

## 2018-03-29 NOTE — PROGRESS NOTES
ICU Multi-Disciplinary Note  44 y/o male who was admitted on 3/29 with parapharyngeal soft tissue edema and issues swallowing. CT soft tissue showed left tonsillar and peritonsillar softtissue thickening with hypodensity which could be phlegmon or early abscess, no drainable region. Also noted to have airway narrowing in the subglottic region. The patient was stridorous in the ED. Anesthesia placed a nasal airway and examined with a scope. There was clear access to vocal cords. Intubation was deferred. Patient was started on decadron, unasyn and clinca. ENT will scope again today.  This AM, the patient states that he feels improved from yesterday. He denies any SOB or issues breathing. He denies any issues handing his secretions. No stridor or use of accessory muscles noted on exam.   Patient condition reviewed and discussed while on multidisciplinary rounds today.   The Critical Care service will continue to follow peripherally while patient is within the ICU. We are readily available should issues arise.  Please feel free to contact us for critical care issues with which we may be of assistance. For all other concerns, please contact primary service first.     Aicha Rodriguez

## 2018-03-29 NOTE — PROGRESS NOTES
Racemic Epi given in ED. BS clear, no obvious stridor noted. Pt does not claim to feel constricted with his breathing.     3/28/2018  Maria R Richter RRT

## 2018-03-29 NOTE — H&P
Admitted:     03/28/2018      DATE OF ADMISSION: 03/29/2018      PRIMARY CARE PHYSICIAN:  None here.      CHIEF COMPLAINT:  Sore throat, dyspnea and trouble swallowing.      HISTORY OF PRESENT ILLNESS:  Tom Benavides is a 43-year-old apparently healthy male who presented to the ER with the above symptoms.  I discussed with the patient and Dr. Trierweiler, ED attending, who evaluated the patient in the ER for further information.      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  This evening at 5 p.m. he came to his hotel and took a nap.  He woke up at 6  p.m., feeling scratchy sensation in his left lower neck/throat.  He felt it was sore and was worse with eating.  The throat discomfort became progressively worse and by 9 p.m. he felt his throat was swollen and as if someone was squeezing his throat.  He did salt water gargle and drank tea, but the symptoms continued to worsen.  He talked to his wife as well who felt his voice was hoarse, then decided to come to the ER and came via [employer].      The patient reports some runny nose and also was exposed to some dust at his work yesterday.  He otherwise denies any similar episodes or any reaction suggestive of angioedema in the past.  He is not on any medication.  He is taking his usual food, does not have any food allergy and denies taking any new foods.  Denies fever.  He feels dyspneic.  Denies headache, chest pain, nausea, vomiting.      In ER, the patient was evaluated by Dr. Trierweiler.  Lab workup showed mild leukocytosis with a white cell count of 14.8.  CT soft tissue neck was done which showed left tonsillar and peritonsillar soft tissue thickening with a tiny hypodensity which could be phlegmon or early abscess, but no drainable abscess noted.  There was also thickening of epiglottis and left aryepiglottic fold and significant thickening with resultant airway narrowing in immediate subglottic region.  The patient was noted to have stridor  as well.  He was phonating and did not have trismus.  He was given IV fluid, Decadron 10 mg IV, Toradol 30 mg IV, Unasyn 3 grams IV, and racemic epinephrine nebulization 0.5 mL.        The patient was re-evaluated by ED attending along with anesthesiologist, Dr. Mills, and nasal airway was placed. His posterior pharynx and laryngeal area was examined with scope.  His posterior pharynx was inflamed and swollen, but with clear access to vocal cords without any asymmetric mass.  At that point, intubation was deferred.  Then ICU attending, Dr. Han , was contacted.  She agreed with ICU admission and will be available for airway management if patient decompensates.  I then evaluated the patient and after that I contacted Dr. Gail Mathews from ENT and reviewed the patient's presentation.  Recommended continuing current management and to closely observe in ICU.  The patient is being admitted to ICU for further management.      REVIEW OF SYSTEMS:  All 10-point systems were reviewed and is negative other than mentioned in the HPI.      PAST MEDICAL/SURGICAL HISTORY:  None.      ALLERGIES:  NO KNOWN MEDICATION ALLERGIES.      PRIOR TO ADMISSION MEDICATIONS:  None.      SOCIAL HISTORY:  Denies smoking or any recreational drug use.  Drinks alcohol occasionally.  He lives with his wife in [place].  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      FAMILY HISTORY:  Reviewed and is noncontributory to his presentation.      PHYSICAL EXAMINATION:   GENERAL:  The patient is alert, awake, oriented, sitting up, appears anxious and restless, but does not appear to be in distress.  He is constantly spitting clear secretions.   VITAL SIGNS:  Blood pressure 151/91, heart rate 105, temperature 97.9, respirations 16, pulse ox 95% on room air.   HEENT:  Pupils are bilaterally equal, round, reactive to light.  Mild conjunctival congestion noted.  Nasal mucosa congestion is noted.  Oral cavity exam reveals normal tongue, moist, no swelling.   Posterior pharynx can be visualized.  There is no tonsillar enlargement, erythema, or any exudate or pus points noted.   NECK:  Supple.  Does have mild tenderness on the left side of the neck around the sternocleidomastoid muscle.   RESPIRATORY:  Bilateral equal air entry. Clear to auscultation of both lungs. He does not have stridor which could be heard externally, but with a stethoscope, a vibrating flapping sound could be heard over his neck.  He is able to speak, though voice is hoarse and it does improve after he clears the secretion.  He is on room air.   CARDIOVASCULAR:  S1, S2, regular, mildly tachycardic.  No murmur, rub, gallop.   ABDOMEN:  Soft, nontender, bowel sounds active.   MUSCULOSKELETAL:  No edema.   SKIN:  No rash.   NEUROLOGIC:  Alert, oriented, appears somewhat anxious, but mostly calm, very pleasant.  No neurological deficits noted.      LABORATORY DATA:  White cell count 14.8.  Otherwise, CBC is normal.  BMP normal, creatinine 1.13.  Blood sugar 103.  Rapid Strep negative.      IMAGING:  CT soft tissue neck: Report was reviewed as above.      ASSESSMENT AND PLAN:  Tom Benavides is a 43-year-old male presenting with sore throat and dyspnea.   1.  Parapharyngeal soft tissue edema: Suspected infectious etiology with narrowing of the subglottic region.  As stated above, the patient had rapidly worsening symptoms.  CT soft tissue neck does not show abscess, but swelling and phlegmon.  Presentation was reviewed with the anesthesiologist and the intensivist, patient will be admitted in ICU for close monitoring of his airway.  The patient was started on Unasyn and dexamethasone in the ER, also received racemic epinephrine.  I will continue Unasyn, clindamycin, dexamethasone IV.  I have discussed with Dr. Mathews from ENT and have consulted as well.  Recommends to continue above management and will be evaluated in a.m.  We will also use racemic epinephrine neb p.r.n.  Toradol and hydromorphone as  needed IV for pain.  The patient will be strictly n.p.o.  Recommended if Robinul could be used given excessive secretion; will review with intensivist as well regarding above.  The patient will be monitored with continuous pulse ox and telemetry as well.  Continue IV hydration overnight.   2.  Deep venous thrombosis prophylaxis with PCDs.   3.  Gastrointestinal prophylaxis: Famotidine IV since he is on steroid, is n.p.o., and will likely need NSAID as well for pain.      CODE STATUS:  FULL CODE BY DEFAULT.      Above plan was discussed with the patient.  Anticipate 2-3 days of hospital stay.  Inpatient orders entered.         HAY KING MD             D: 2018   T: 2018   MT:       Name:     JUSTIN MARLOW   MRN:      [id number]        Account:      [id number]   :      1975        Admitted:     2018                   Document: [id number]

## 2018-03-29 NOTE — PROGRESS NOTES
The patient is seen and examined.  He will remain in ICU overnight per ENT due to high risk for airway compromise.  If he remains stable overnight, discharge home on 5 days course prednisone and 10 day course of Augmentin.

## 2018-03-29 NOTE — PROVIDER NOTIFICATION
Dr. Phillip notified of pharmacist's request for PPI coverage.  Patient prescribed scheduled decadron, discussed blood sugar trends; will continue to monitor.

## 2018-03-30 VITALS
TEMPERATURE: 98 F | HEART RATE: 89 BPM | SYSTOLIC BLOOD PRESSURE: 132 MMHG | RESPIRATION RATE: 18 BRPM | OXYGEN SATURATION: 95 % | DIASTOLIC BLOOD PRESSURE: 82 MMHG | WEIGHT: 263.45 LBS

## 2018-03-30 LAB
ANION GAP SERPL CALCULATED.3IONS-SCNC: 8 MMOL/L (ref 3–14)
BASOPHILS # BLD AUTO: 0 10E9/L (ref 0–0.2)
BASOPHILS NFR BLD AUTO: 0 %
BUN SERPL-MCNC: 17 MG/DL (ref 7–30)
CALCIUM SERPL-MCNC: 8.6 MG/DL (ref 8.5–10.1)
CHLORIDE SERPL-SCNC: 107 MMOL/L (ref 94–109)
CO2 SERPL-SCNC: 23 MMOL/L (ref 20–32)
CREAT SERPL-MCNC: 0.84 MG/DL (ref 0.66–1.25)
DIFFERENTIAL METHOD BLD: ABNORMAL
EOSINOPHIL # BLD AUTO: 0 10E9/L (ref 0–0.7)
EOSINOPHIL NFR BLD AUTO: 0 %
ERYTHROCYTE [DISTWIDTH] IN BLOOD BY AUTOMATED COUNT: 12.6 % (ref 10–15)
GFR SERPL CREATININE-BSD FRML MDRD: >90 ML/MIN/1.7M2
GLUCOSE SERPL-MCNC: 152 MG/DL (ref 70–99)
HCT VFR BLD AUTO: 38.3 % (ref 40–53)
HGB BLD-MCNC: 13.3 G/DL (ref 13.3–17.7)
IMM GRANULOCYTES # BLD: 0 10E9/L (ref 0–0.4)
IMM GRANULOCYTES NFR BLD: 0.3 %
LYMPHOCYTES # BLD AUTO: 0.8 10E9/L (ref 0.8–5.3)
LYMPHOCYTES NFR BLD AUTO: 5.1 %
MCH RBC QN AUTO: 32 PG (ref 26.5–33)
MCHC RBC AUTO-ENTMCNC: 34.7 G/DL (ref 31.5–36.5)
MCV RBC AUTO: 92 FL (ref 78–100)
MONOCYTES # BLD AUTO: 0.6 10E9/L (ref 0–1.3)
MONOCYTES NFR BLD AUTO: 3.8 %
NEUTROPHILS # BLD AUTO: 14 10E9/L (ref 1.6–8.3)
NEUTROPHILS NFR BLD AUTO: 90.8 %
NRBC # BLD AUTO: 0 10*3/UL
NRBC BLD AUTO-RTO: 0 /100
PLATELET # BLD AUTO: 164 10E9/L (ref 150–450)
POTASSIUM SERPL-SCNC: 4.1 MMOL/L (ref 3.4–5.3)
RBC # BLD AUTO: 4.16 10E12/L (ref 4.4–5.9)
SODIUM SERPL-SCNC: 138 MMOL/L (ref 133–144)
WBC # BLD AUTO: 15.4 10E9/L (ref 4–11)

## 2018-03-30 PROCEDURE — 25000125 ZZHC RX 250: Performed by: HOSPITALIST

## 2018-03-30 PROCEDURE — 85025 COMPLETE CBC W/AUTO DIFF WBC: CPT | Performed by: HOSPITALIST

## 2018-03-30 PROCEDURE — 25000128 H RX IP 250 OP 636: Performed by: HOSPITALIST

## 2018-03-30 PROCEDURE — 99239 HOSP IP/OBS DSCHRG MGMT >30: CPT | Performed by: INTERNAL MEDICINE

## 2018-03-30 PROCEDURE — 80048 BASIC METABOLIC PNL TOTAL CA: CPT | Performed by: HOSPITALIST

## 2018-03-30 PROCEDURE — 36415 COLL VENOUS BLD VENIPUNCTURE: CPT | Performed by: HOSPITALIST

## 2018-03-30 RX ORDER — PREDNISONE 20 MG/1
40 TABLET ORAL DAILY
Qty: 5 TABLET | Refills: 0 | Status: SHIPPED | OUTPATIENT
Start: 2018-03-30 | End: 2018-04-04

## 2018-03-30 RX ADMIN — AMPICILLIN SODIUM AND SULBACTAM SODIUM 3 G: 2; 1 INJECTION, POWDER, FOR SOLUTION INTRAMUSCULAR; INTRAVENOUS at 05:27

## 2018-03-30 RX ADMIN — DEXAMETHASONE SODIUM PHOSPHATE 10 MG: 4 INJECTION, SOLUTION INTRAMUSCULAR; INTRAVENOUS at 07:30

## 2018-03-30 RX ADMIN — DEXAMETHASONE SODIUM PHOSPHATE 10 MG: 4 INJECTION, SOLUTION INTRAMUSCULAR; INTRAVENOUS at 03:03

## 2018-03-30 RX ADMIN — SODIUM CHLORIDE: 9 INJECTION, SOLUTION INTRAVENOUS at 05:27

## 2018-03-30 RX ADMIN — AMPICILLIN SODIUM AND SULBACTAM SODIUM 3 G: 2; 1 INJECTION, POWDER, FOR SOLUTION INTRAMUSCULAR; INTRAVENOUS at 00:05

## 2018-03-30 RX ADMIN — CLINDAMYCIN PHOSPHATE 900 MG: 18 INJECTION, SOLUTION INTRAVENOUS at 03:04

## 2018-03-30 ASSESSMENT — PAIN DESCRIPTION - DESCRIPTORS: DESCRIPTORS: SORE

## 2018-03-30 NOTE — PLAN OF CARE
Problem: Patient Care Overview  Goal: Plan of Care/Patient Progress Review  Outcome: Improving  Patient discharging.  Will be taking a cab to a hotel since pt is from [place].  Reviewed discharge instructions, follow-up recommendations, and medications.  Patient denies pain at this time.  Left side of neck has mild swelling, but resolving.  Patient is able to maintain airway and swallow.  IVs removed.  Patient verbalizes understanding.

## 2018-03-30 NOTE — PROGRESS NOTES
ICU Multi-Disciplinary Note  Patient condition reviewed and discussed while on multidisciplinary rounds today.   42 y/o male who was admitted on 3/29 with parapharyngeal soft tissue edema and issues swallowing. CT soft tissue showed left tonsillar and peritonsillar soft tissue thickening with hypodensity which could be phlegmon or early abscess, no drainable region. Also noted to have airway narrowing in the subglottic region. The patient was stridorous in the ED. Anesthesia placed a nasal airway and examined with a scope. There was clear access to vocal cords. Intubation was deferred. Patient was started on decadron, unasyn and clinca. Hospitalists are primar with ENT consulting.   The Critical Care service will continue to follow peripherally while patient is within the ICU. We are readily available should issues arise.  Please feel free to contact us for critical care issues with which we may be of assistance. For all other concerns, please contact primary service first.     Fidelina Lott

## 2018-03-30 NOTE — PLAN OF CARE
"Problem: Patient Care Overview  Goal: Plan of Care/Patient Progress Review  Outcome: Improving  Pt stable throughout night. IV antibiotics and IV steroids given. Pt states he has slight throat pain but more like \"a sore throat\"- refused pain medication. Able to maintain secretions. No SOB. Using urinal at bedside- independent in room. Plan to D/C or transfer out of ICU today.       "

## 2018-03-30 NOTE — PLAN OF CARE
Problem: Patient Care Overview  Goal: Plan of Care/Patient Progress Review  Outcome: Improving  C/o minor sore throat and reports swelling has improved greatly. Denies difficulty swallowing secretions. Slightly hoarse still. O2 sats stable on RA. Voiding adequately. Plan to monitor overnight and d/c tomorrow if continues to improve.

## 2018-03-30 NOTE — PROVIDER NOTIFICATION
MD NOTIFICATION    Person Notified: Hieu Ramirez MD    Notification Date/Time: 3/30/2018 0355    Notification Interaction: Talked with physician     Purpose of Notification: Possible transfer orders for pt to go to CCU, stable airway, no issues with breathing    Orders Received: MD deferred to day shift ENT

## 2018-03-31 LAB
BACTERIA SPEC CULT: NORMAL
Lab: NORMAL
SPECIMEN SOURCE: NORMAL

## 2018-04-04 LAB
BACTERIA SPEC CULT: NO GROWTH
BACTERIA SPEC CULT: NO GROWTH
Lab: NORMAL
Lab: NORMAL
SPECIMEN SOURCE: NORMAL
SPECIMEN SOURCE: NORMAL

## 2018-04-05 NOTE — DISCHARGE SUMMARY
Discharge Summary    Tom Benavides MRN# [id number]   YOB: 1975 Age: 43 year old     Date of Admission:  3/28/2018  Date of Discharge:  4/5/2018  Admitting Physician:  Philip Dale MD  Discharge Physician:  Angel Phillip MD  Discharging Service:  Hospitalist     Primary Provider: No Ref-Primary, Physician          Admission Diagnoses:   Cellulitis of parapharyngeal space [J39.0]          Discharge Diagnosis:   Patient Active Problem List   Diagnosis     Subglottic edema             Condition on Discharge:       Discharge vitals: Blood pressure 132/82, pulse 89, temperature 98  F (36.7  C), temperature source Oral, resp. rate 18, weight 119.5 kg (263 lb 7.2 oz), SpO2 95 %.         Gen: Well nourished, well developed, alert and oriented x 3, no acute distressed  HEENT: Atraumatic, normocephalic  Lungs: Clear to ausculation without wheezes, rhonchi, or rales  Heart: Regular rate and rhythm, no murmurs, gallops, or rubs  GI: Bowel sound normal, no hepatosplenomegaly or masses  Lymph: No lymphadenopathy or edema  Skin: No rashes    # Discharge Pain Plan:   - During his hospitalization, Tom experienced pain due to throat pain.  The pain plan for discharge was discussed with Tom and the plan was created in a collaborative fashion.    - Pharmacologic adjuvants:  NSAIDs and Acetaminophen            Procedures / Labs / Imaging:   Most Recent 3 CBC's:  Recent Labs   Lab Test  03/30/18   0545  03/29/18   0535  03/28/18   2219   WBC  15.4*  17.5*  14.8*   HGB  13.3  14.6  15.0   MCV  92  91  91   PLT  164  174  180      Most Recent 3 BMP's:  Recent Labs   Lab Test  03/30/18   0545  03/29/18   0535  03/28/18   2219   NA  138  136  139   POTASSIUM  4.1  4.2  3.5   CHLORIDE  107  104  104   CO2  23  22  26   BUN  17  16  18   CR  0.84  0.95  1.13   ANIONGAP  8  10  9   DANA  8.6  8.9  9.0   GLC  152*  165*  103*     Most Recent 3 Troponin's:No lab results found.    Invalid input(s): TROP,  TROPONINIES  Most Recent 3 INR's:  Recent Labs   Lab Test  03/29/18   0535   INR  1.11     Most Recent 2 LFT's:No lab results found.  Most Recent Cholesterol Panel:No lab results found.  Most Recent 6 Bacteria Isolates From Any Culture (See EPIC Reports for Culture Details):  Recent Labs   Lab Test  03/29/18   0139  03/29/18   0134  03/28/18   2229   CULT  No growth  No growth  No Beta Streptococcus isolated     Most Recent TSH, T4 and HgbA1c: No lab results found.  Results for orders placed or performed during the hospital encounter of 03/28/18   Soft tissue neck CT w contrast    Narrative    CT SCAN OF THE NECK WITH CONTRAST March 28, 2018 11:10 PM     HISTORY: Left throat pain, evaluate for abscess, mass.     TECHNIQUE: Axial images and coronal reformations. Radiation dose for  this scan was reduced using automated exposure control, adjustment of  the mA and/or kV according to patient size, or iterative  reconstruction technique. 80 mL Isovue-370 IV.     COMPARISON: None.    FINDINGS: Marked soft tissue swelling of the left palatine tonsillar  region with a focal low density in the left palatine tonsil measuring  0.4 x 0.7 x 0.6 cm (series 3 image 64). This is concerning for  developing phlegmon/abscess. Edema and hypodensity extends along the  left hypopharynx and also involves the epiglottis bilaterally left  greater than right. Edema also extends into the larynx and also  slightly into the immediate subglottic region. This results in airway  narrowing. No definite loculated abscess is seen within the  hypopharynx, glottic, or subglottic region.    No significant enlarged or necrotic cervical lymph nodes.  Submandibular and parotid glands are unremarkable. No thyroid gland  nodules.    Visualized lung apices are clear. No suspicious osseous lesions.  Polypoid mucous retention cysts or mucosal polyps along the inferior  maxillary sinuses bilaterally.      Impression    IMPRESSION: Marked left palatine tonsillar  edema and swelling with  subcentimeter developing peritonsillar phlegmon/abscess. Edema also  extends along the left hypopharynx and into the epiglottic region  bilaterally left greater than right. Edema is also seen in the larynx  and immediate subglottic trachea. This appears to cause airway  narrowing. Findings are all likely related to infection.    Results discussed with ER physician at 11:20 PM on 3/28/2018.    I agree with the overnight preliminary report by the radiologist.    FLEX LEWIS MD             Medications Prior to Admission:     No prescriptions prior to admission.             Discharge Medications:     Discharge Medication List as of 3/30/2018 11:06 AM      START taking these medications    Details   predniSONE (DELTASONE) 20 MG tablet Take 2 tablets (40 mg) by mouth daily for 5 days, Disp-5 tablet, R-0, E-Prescribe      amoxicillin-clavulanate (AUGMENTIN) 875-125 MG per tablet Take 1 tablet by mouth 2 times daily, Disp-20 tablet, R-0, E-Prescribe         CONTINUE these medications which have NOT CHANGED    Details   multivitamin, therapeutic with minerals (THERA-VIT-M) TABS tablet Take 1 tablet by mouth daily, Historical      omega-3 acid ethyl esters (LOVAZA) 1 G capsule Take 1 g by mouth daily, Historical                   Brief History of Illness:   Tom Benavides is a 43 year old male who was admitted for pharyngitis.          Hospital Course:   Is a 43-year-old male who was admitted after rapidly developing left-sided throat pain associated with voice change, dysphagia, odynophagia, stridor and dyspnea.  CT scan in the emergency room demonstrated significant left parapharyngeal swelling without abscess.  Patient was admitted to the intensive care unit for close monitoring of his airway.  Patient was treated with Decadron and Unasyn.  ENT was consulted.  Laryngoscopy demonstrated erythema of the left lateral pharyngeal wall just lateral to the ariepiglottic fold.  Patient was observed  again overnight.  By morning his symptoms had significantly improved.  He was discharged on Augmentin for 10 days and prednisone for 5 days.    Greater than 35 minutes were spent in discharge planning.             Pending Results:   Unresulted Labs Ordered in the Past 30 Days of this Admission     No orders found from 1/27/2018 to 3/29/2018.